# Patient Record
Sex: MALE | Race: WHITE | HISPANIC OR LATINO | URBAN - METROPOLITAN AREA
[De-identification: names, ages, dates, MRNs, and addresses within clinical notes are randomized per-mention and may not be internally consistent; named-entity substitution may affect disease eponyms.]

---

## 2019-01-27 ENCOUNTER — APPOINTMENT (OUTPATIENT)
Dept: RADIOLOGY | Facility: MEDICAL CENTER | Age: 34
End: 2019-01-27
Attending: EMERGENCY MEDICINE

## 2019-01-27 ENCOUNTER — HOSPITAL ENCOUNTER (EMERGENCY)
Facility: MEDICAL CENTER | Age: 34
End: 2019-01-27
Attending: EMERGENCY MEDICINE

## 2019-01-27 VITALS
OXYGEN SATURATION: 94 % | RESPIRATION RATE: 18 BRPM | WEIGHT: 180 LBS | DIASTOLIC BLOOD PRESSURE: 85 MMHG | TEMPERATURE: 96.3 F | BODY MASS INDEX: 27.28 KG/M2 | HEIGHT: 68 IN | SYSTOLIC BLOOD PRESSURE: 124 MMHG | HEART RATE: 74 BPM

## 2019-01-27 DIAGNOSIS — S20.219A CONTUSION OF CHEST WALL, UNSPECIFIED LATERALITY, INITIAL ENCOUNTER: ICD-10-CM

## 2019-01-27 DIAGNOSIS — F10.929 ACUTE ALCOHOLIC INTOXICATION WITH COMPLICATION (HCC): ICD-10-CM
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
## 2019-01-27 PROCEDURE — 71045 X-RAY EXAM CHEST 1 VIEW: CPT

## 2019-01-27 PROCEDURE — 70450 CT HEAD/BRAIN W/O DYE: CPT

## 2019-01-27 PROCEDURE — 72125 CT NECK SPINE W/O DYE: CPT

## 2019-01-27 PROCEDURE — 307740 HCHG GREEN TRAUMA TEAM SERVICES

## 2019-01-27 PROCEDURE — 99284 EMERGENCY DEPT VISIT MOD MDM: CPT

## 2019-01-27 NOTE — ED NOTES
Pt to discharge with NHP . Pt assisted x1 with putting clothes on, reports Pain with ROM of left shoulder, CNS intact.  Pt was provided discharge education on shoulder contusion.  Pt was walked out by NHP in stable condition.

## 2019-01-27 NOTE — ED NOTES
Pt to EOB to use urinal. Pt expressed pain 10/10 in left shoulder with movement. Instructed pt to change arm position and rest it at their side. Pt reports less pain after comfort measures and education.
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
## 2019-06-08 ENCOUNTER — HOSPITAL ENCOUNTER (EMERGENCY)
Facility: MEDICAL CENTER | Age: 34
End: 2019-06-08
Attending: EMERGENCY MEDICINE

## 2019-06-08 ENCOUNTER — APPOINTMENT (OUTPATIENT)
Dept: RADIOLOGY | Facility: MEDICAL CENTER | Age: 34
End: 2019-06-08
Attending: EMERGENCY MEDICINE

## 2019-06-08 VITALS
HEIGHT: 70 IN | DIASTOLIC BLOOD PRESSURE: 79 MMHG | SYSTOLIC BLOOD PRESSURE: 122 MMHG | BODY MASS INDEX: 25.75 KG/M2 | RESPIRATION RATE: 18 BRPM | HEART RATE: 64 BPM | WEIGHT: 179.9 LBS | TEMPERATURE: 98.9 F

## 2019-06-08 DIAGNOSIS — T14.8XXA AVULSION FRACTURE: ICD-10-CM

## 2019-06-08 DIAGNOSIS — T14.8XXA BRUISE: ICD-10-CM

## 2019-06-08 PROCEDURE — 73620 X-RAY EXAM OF FOOT: CPT | Mod: RT

## 2019-06-08 PROCEDURE — 73600 X-RAY EXAM OF ANKLE: CPT | Mod: RT

## 2019-06-08 PROCEDURE — 99284 EMERGENCY DEPT VISIT MOD MDM: CPT

## 2019-06-08 RX ORDER — IBUPROFEN 800 MG/1
800 TABLET ORAL EVERY 8 HOURS PRN
Qty: 15 TAB | Refills: 0 | Status: SHIPPED
Start: 2019-06-08

## 2019-06-08 ASSESSMENT — LIFESTYLE VARIABLES: DO YOU DRINK ALCOHOL: NO

## 2019-06-09 NOTE — DISCHARGE INSTRUCTIONS
Please have your right foot, re-x rayed in 7 days from today (6/8/19).  Use tylenol/motrin as directed for pain control.

## 2019-06-09 NOTE — ED NOTES
Pt discharged home. Explained discharge instructions. Questions and comments addressed. Pt verbalized understanding of instructions. Wristband removed. Pt advised to follow-up with clinic in [facility] for xray in 7 days or return to ED for any new or worsening of symptoms. Pt is ambulating well and steady on feet. VS stable. [employer] x2 will be escorting pt back to [facility].       Pt verbalized understanding of medication instructions.
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
